# Patient Record
Sex: MALE | Race: WHITE | Employment: FULL TIME | URBAN - METROPOLITAN AREA
[De-identification: names, ages, dates, MRNs, and addresses within clinical notes are randomized per-mention and may not be internally consistent; named-entity substitution may affect disease eponyms.]

---

## 2017-12-25 ENCOUNTER — HOSPITAL ENCOUNTER (EMERGENCY)
Age: 32
Discharge: HOME | End: 2017-12-25
Attending: EMERGENCY MEDICINE
Payer: COMMERCIAL

## 2017-12-25 VITALS
HEART RATE: 77 BPM | TEMPERATURE: 98.6 F | BODY MASS INDEX: 31.14 KG/M2 | DIASTOLIC BLOOD PRESSURE: 83 MMHG | RESPIRATION RATE: 18 BRPM | SYSTOLIC BLOOD PRESSURE: 127 MMHG | WEIGHT: 235 LBS | OXYGEN SATURATION: 98 % | HEIGHT: 73 IN

## 2017-12-25 DIAGNOSIS — B30.9 VIRAL CONJUNCTIVITIS OF LEFT EYE: Primary | ICD-10-CM

## 2017-12-25 PROCEDURE — 6370000000 HC RX 637 (ALT 250 FOR IP): Performed by: EMERGENCY MEDICINE

## 2017-12-25 PROCEDURE — 99282 EMERGENCY DEPT VISIT SF MDM: CPT

## 2017-12-25 RX ORDER — TOBRAMYCIN AND DEXAMETHASONE 3; 1 MG/ML; MG/ML
1 SUSPENSION/ DROPS OPHTHALMIC
Qty: 5 ML | Refills: 0 | Status: SHIPPED | OUTPATIENT
Start: 2017-12-25 | End: 2018-01-04

## 2017-12-25 RX ORDER — TETRACAINE HYDROCHLORIDE 5 MG/ML
1 SOLUTION OPHTHALMIC ONCE
Status: COMPLETED | OUTPATIENT
Start: 2017-12-25 | End: 2017-12-25

## 2017-12-25 RX ADMIN — FLUORESCEIN SODIUM 1 MG: 0.6 STRIP OPHTHALMIC at 03:11

## 2017-12-25 RX ADMIN — TETRACAINE HYDROCHLORIDE 1 DROP: 5 SOLUTION OPHTHALMIC at 03:11

## 2017-12-25 ASSESSMENT — ENCOUNTER SYMPTOMS
SORE THROAT: 0
EYE PAIN: 1
VOMITING: 0
COLOR CHANGE: 0
DIARRHEA: 0
EYE DISCHARGE: 1
COUGH: 0
CONSTIPATION: 0
NAUSEA: 0
STRIDOR: 0
ABDOMINAL PAIN: 0
WHEEZING: 0
SHORTNESS OF BREATH: 0
EYE REDNESS: 1

## 2017-12-25 ASSESSMENT — PAIN DESCRIPTION - ORIENTATION: ORIENTATION: LEFT

## 2017-12-25 ASSESSMENT — PAIN SCALES - GENERAL: PAINLEVEL_OUTOF10: 6

## 2017-12-25 ASSESSMENT — PAIN DESCRIPTION - LOCATION: LOCATION: EYE

## 2017-12-25 NOTE — ED PROVIDER NOTES
[facility]  [address] [facility] [address]  Phone: [phone]  Fax: [phone]        Pt Name: oZya Capellan  MRN: [id number]  [place] 1985  Date of evaluation: 12/25/17      CHIEF COMPLAINT       Chief Complaint   Patient presents with    Eye Drainage     red, sore          HISTORY OF PRESENT ILLNESS  (Location/Symptom, Timing/Onset, Context/Setting, Quality, Duration, Modifying Factors, Severity.)    Zoya Capellan is a 33 y.o. male who presents Complaining of red eyes and soreness to the left eye. He really started to notice it today. He relates he's been congested and family members have also had some upper respiratory symptoms. He assumed it was just viral in nature. But when the eye started tearing up he wasn't sure if he maybe had something in it. He denies working with any welding or woodwork. He has had no injury that he is aware of. He relates the discharge is clear in nature. He relates this just rather sore. He denies any headache. He's had no vision changes. REVIEW OF SYSTEMS    (2-9 systems for level 4, 10 or more for level 5)     Review of Systems   Constitutional: Negative for activity change, appetite change, chills, fatigue and fever. HENT: Negative for congestion, ear pain and sore throat. Eyes: Positive for pain, discharge and redness. Respiratory: Negative for cough, shortness of breath, wheezing and stridor. Cardiovascular: Negative for chest pain. Gastrointestinal: Negative for abdominal pain, constipation, diarrhea, nausea and vomiting. Genitourinary: Negative for decreased urine volume and difficulty urinating. Musculoskeletal: Negative for arthralgias and myalgias. Skin: Negative for color change and rash. Neurological: Negative for dizziness, weakness and headaches. Psychiatric/Behavioral: Negative for behavioral problems and confusion.        PAST MEDICAL HISTORY    has no past medical history on occasionally words are mis-transcribed.)    oL MD, F.A.C.E.P.   Attending Emergency Medicine Physician        Devyn Gallo MD  12/25/17 0893

## 2017-12-25 NOTE — ED NOTES
Pt to ER by self, ambulated to room, steady gait. Pt reports left eye pain, redness and excessive tearing that started last night about 1900 and worsening throughout the night. Pt denies any wood/ metal or welding work. Denies any head/ face trauma. Pt does report he has been sick with cold symptoms and fell asleep with contacts in, unsure if this is related. Pt does report slight blurred vision in left eye.  Pt rates pain 6/10, reports using OTC eye drops with no relief, denies analgesics prior to arrival. Pt calm, cooperative, respers even non labored, skin warm pink, no distress, here for eval.      Hernán Gomez RN  12/25/17 1898

## 2018-11-28 ENCOUNTER — HOSPITAL ENCOUNTER (EMERGENCY)
Age: 33
Discharge: HOME | End: 2018-11-28
Attending: EMERGENCY MEDICINE
Payer: COMMERCIAL

## 2018-11-28 ENCOUNTER — APPOINTMENT (OUTPATIENT)
Dept: ULTRASOUND IMAGING | Age: 33
End: 2018-11-28
Payer: COMMERCIAL

## 2018-11-28 VITALS
TEMPERATURE: 98.3 F | DIASTOLIC BLOOD PRESSURE: 67 MMHG | RESPIRATION RATE: 18 BRPM | HEART RATE: 54 BPM | HEIGHT: 73 IN | BODY MASS INDEX: 31.14 KG/M2 | WEIGHT: 235 LBS | SYSTOLIC BLOOD PRESSURE: 151 MMHG | OXYGEN SATURATION: 97 %

## 2018-11-28 DIAGNOSIS — N45.1 EPIDIDYMITIS, RIGHT: Primary | ICD-10-CM

## 2018-11-28 LAB
BILIRUBIN URINE: NEGATIVE
COLOR: YELLOW
COMMENT UA: NORMAL
GLUCOSE URINE: NEGATIVE
KETONES, URINE: NEGATIVE
LEUKOCYTE ESTERASE, URINE: NEGATIVE
NITRITE, URINE: NEGATIVE
PH UA: 7 (ref 5–8)
PROTEIN UA: NEGATIVE
SPECIFIC GRAVITY UA: 1.01 (ref 1–1.03)
TURBIDITY: CLEAR
URINE HGB: NEGATIVE
UROBILINOGEN, URINE: NORMAL

## 2018-11-28 PROCEDURE — 93976 VASCULAR STUDY: CPT

## 2018-11-28 PROCEDURE — 76870 US EXAM SCROTUM: CPT

## 2018-11-28 PROCEDURE — 6370000000 HC RX 637 (ALT 250 FOR IP): Performed by: PHYSICIAN ASSISTANT

## 2018-11-28 PROCEDURE — 96372 THER/PROPH/DIAG INJ SC/IM: CPT

## 2018-11-28 PROCEDURE — 6360000002 HC RX W HCPCS: Performed by: EMERGENCY MEDICINE

## 2018-11-28 PROCEDURE — 99284 EMERGENCY DEPT VISIT MOD MDM: CPT

## 2018-11-28 RX ORDER — DOXYCYCLINE 100 MG/1
100 TABLET ORAL 2 TIMES DAILY
Qty: 13 TABLET | Refills: 0 | Status: SHIPPED | OUTPATIENT
Start: 2018-11-28 | End: 2018-12-05

## 2018-11-28 RX ORDER — KETOROLAC TROMETHAMINE 30 MG/ML
30 INJECTION, SOLUTION INTRAMUSCULAR; INTRAVENOUS ONCE
Status: COMPLETED | OUTPATIENT
Start: 2018-11-28 | End: 2018-11-28

## 2018-11-28 RX ORDER — DOXYCYCLINE HYCLATE 100 MG
100 TABLET ORAL ONCE
Status: COMPLETED | OUTPATIENT
Start: 2018-11-28 | End: 2018-11-28

## 2018-11-28 RX ADMIN — KETOROLAC TROMETHAMINE 30 MG: 30 INJECTION, SOLUTION INTRAMUSCULAR at 17:50

## 2018-11-28 RX ADMIN — DOXYCYCLINE HYCLATE 100 MG: 100 TABLET, COATED ORAL at 19:49

## 2018-11-28 ASSESSMENT — PAIN DESCRIPTION - PAIN TYPE
TYPE: ACUTE PAIN
TYPE: ACUTE PAIN

## 2018-11-28 ASSESSMENT — PAIN DESCRIPTION - LOCATION
LOCATION: SCROTUM
LOCATION: SCROTUM

## 2018-11-28 ASSESSMENT — PAIN DESCRIPTION - FREQUENCY: FREQUENCY: INTERMITTENT

## 2018-11-28 ASSESSMENT — PAIN DESCRIPTION - ORIENTATION: ORIENTATION: RIGHT

## 2018-11-28 ASSESSMENT — PAIN SCALES - GENERAL
PAINLEVEL_OUTOF10: 7
PAINLEVEL_OUTOF10: 7
PAINLEVEL_OUTOF10: 2

## 2018-11-28 NOTE — ED PROVIDER NOTES
SpO2 97%   BMI 31.00 kg/m²   Constitutional:  Well developed   Eyes:  Pupils equal and readily reactive to light  HENT:  Atraumatic, external ears normal, nose normal.  Respiratory:  Clear to auscultation bilaterally with good air exchange, no W/R/R  Cardiovascular:  RRR with normal S1 and S2  Gastrointestinal/Abdomen:   Soft, NT, BS present. :   Right testicular chord/upper testicular TTP. No overt asymmetry/edema/rash. Higher riding right testicule. Cremasteric reflex appears intact. No other signs of indirect/direct hernia. Musculoskeletal:  No edema, no tenderness, no deformities. Back:  No CVA tenderness. Integument:  No rash. Neurologic:  Alert & age appropriate interaction and mentation, no focal deficits noted       DIAGNOSTIC RESULTS     EKG: All EKG's are interpreted by the Emergency Department Physician who either signs or Co-signs this chart in the absence of a cardiologist.  Not indicated    RADIOLOGY:   Reviewed the radiologist:  US DUP ABD PEL RETRO SCROT LIMITED   Final Result   No evidence of testicular torsion or epididymo-orchitis. A few calcifications are present in both testes, borderline for   microlithiasis. This, in and of itself, is a benign finding. If the patient   has risk factors for testicular malignancy (personal or family history of   germ cell tumor), consider follow-up annual ultrasound. Otherwise no   follow-up is recommended recommend other than routine soft examination. US SCROTUM AND TESTICLES    (Results Pending)          LABS:  Labs Reviewed   URINE RT REFLEX TO CULTURE         EMERGENCY DEPARTMENT COURSE:     1810  Await Venous Doppler result, UA negative. No abd pain/hernia or overt signs of torsion. 2000  Doxy here and for home. Attending discharged this patient after discussing all labwork/imaging results that were finalized. Treatment plan and recommended follow-up discussed with them as well.       Orders Placed This Encounter

## 2019-11-11 NOTE — ED NOTES
Pt presents to ED with a c/c of testicular pain that started this morning. Pt states that it feels similar to when he had his vasectomy approx one year ago. Pt states that he was playing basketball yesterday . Pt states he did a self exam however denied feeling any abnormalities. Pt denies chest pain, SOB, nausea, vomiting, diarrhea, fevers, or chills. Pt ambulatory with steady gait. Pt resting in bed in NAD, skin pink warm and dry, respirations even and unlabored and call light within reach.      Roddy Conner RN  11/28/18 2710 Will follow up outpatient